# Patient Record
(demographics unavailable — no encounter records)

---

## 2025-04-10 NOTE — PROCEDURE
[FreeTextEntry1] : PFT April 10, 2025 Normal spirometry Lung labs are normal Diffusion normal range 190% of predicted [place] pattern consistent with high clinical suspicion obstructive sleep apnea  Chest x-ray PA lateral April 10, 2025 Bilateral predominant mid to lower lung zone nodularity interstitial change No prior chest x-ray available for review

## 2025-04-10 NOTE — HISTORY OF PRESENT ILLNESS
[Never] : never [Awakes Unrefreshed] : awakes unrefreshed [Awakes with Dry Mouth] : awakes with dry mouth [Daytime Somnolence] : daytime somnolence [Snoring] : snoring [TextBox_4] : 52-year-old patient Sleep evaluation Apparently patient had a sleep study done at [facility] Data is not available for review Patient was brought to the office for treatment protocol evaluation Past medical history Hypertension Hyperlipidemia Prediabetes Overweight  Past surgical history appendectomy  Allergies none of the medicine  Family history Father  diabetes Mother  diabetes

## 2025-04-10 NOTE — DISCUSSION/SUMMARY
[FreeTextEntry1] : High clinical suspicion for documented obstructive sleep apnea Data not available for review Inform patient and wife will not be able to order appropriate treatment focusing primarily on auto CPAP without review of the data to make appropriate recommendations and settings Additionally discussed pathophysiology of obstructive sleep apnea Noted to avoid heavy meals sedatives alcohol before bedtime Exercise program Weight loss Noted that Zepbound has been approved per FDA something to be considered with his prediabetes if needed as part of treatment protocol Noted that there is also clinical benefit for hypertension with treatment of sleep apnea with a CPAP device  Abnormal chest x-ray Rule out interstitial lung disease nodular pattern CT chest ordered for completeness

## 2025-06-19 NOTE — REASON FOR VISIT
[Follow-Up] : a follow-up visit [Abnormal CXR/ Chest CT] : an abnormal CXR/ chest CT [TextBox_44] : PEDRO LUIS

## 2025-06-19 NOTE — PROCEDURE
[FreeTextEntry1] : Sleep study Dionne 15 June 16, 2025 Moderate obstructive sleep apnea AHI 21 Positional component Overall RDI 35 Percent time less than 90% on room air 0.5% Percent time snoring greater than 30 dB 15.9% Pender score 15 Alonso desaturation 85.4%     CT chest April 24, 2025 f/u Oct 2025 Peripheral right middle lobe groundglass with mild underlying bronchiolectasis Bilateral fissural nodules compatible with intrapulmonary lymph nodes Other pulmonary nodules Largest measuring 6 mm lingula Left upper lobe calcified granuloma No pleural effusion Overall impression mild peripheral groundglass limited to the right middle lobe favor postinfectious postinflammatory Bilateral pulmonary nodules with a left upper lobe granuloma Dominant nodule 6 mm Would recommend a repeat CT chest 6-12 months  PFT April 10, 2025 Normal spirometry Lung voulmes are normal Diffusion normal range 119 % of predicted [place] pattern consistent with high clinical suspicion obstructive sleep apnea  Chest x-ray PA lateral April 10, 2025 Bilateral predominant mid to lower lung zone nodularity interstitial change No prior chest x-ray available for review

## 2025-06-19 NOTE — DISCUSSION/SUMMARY
[FreeTextEntry1] :  obstructive sleep apnea DSleep study Dionne 15 June 16, 2025 Moderate obstructive sleep apnea AHI 21 Positional component Overall RDI 35 Percent time less than 90% on room air 0.5% Percent time snoring greater than 30 dB 15.9% [place] score 15 Alonso desaturation 85.4% Inform patient and wife will not be able to order appropriate treatment focusing primarily on auto CPAP without review of the data to make appropriate recommendations and settings AUTO 6-18 cm H20 Additionally discussed pathophysiology of obstructive sleep apnea Noted to avoid heavy meals sedatives alcohol before bedtime Exercise program Weight loss Noted that Zepbound has been approved per FDA something to be considered with his prediabetes if needed as part of treatment protocol Noted that there is also clinical benefit for hypertension with treatment of sleep apnea with a CPAP device  Abnormal chest x-ray Rule out interstitial lung disease nodular pattern CT chest ordered for completeness